# Patient Record
Sex: FEMALE | URBAN - METROPOLITAN AREA
[De-identification: names, ages, dates, MRNs, and addresses within clinical notes are randomized per-mention and may not be internally consistent; named-entity substitution may affect disease eponyms.]

---

## 2023-11-09 ENCOUNTER — APPOINTMENT (RX ONLY)
Dept: URBAN - METROPOLITAN AREA CLINIC 48 | Facility: CLINIC | Age: 58
Setting detail: DERMATOLOGY
End: 2023-11-09

## 2023-11-09 DIAGNOSIS — L82.0 INFLAMED SEBORRHEIC KERATOSIS: ICD-10-CM

## 2023-11-09 DIAGNOSIS — D22 MELANOCYTIC NEVI: ICD-10-CM

## 2023-11-09 DIAGNOSIS — L72.0 EPIDERMAL CYST: ICD-10-CM

## 2023-11-09 PROBLEM — D48.5 NEOPLASM OF UNCERTAIN BEHAVIOR OF SKIN: Status: ACTIVE | Noted: 2023-11-09

## 2023-11-09 PROBLEM — D22.9 MELANOCYTIC NEVI, UNSPECIFIED: Status: ACTIVE | Noted: 2023-11-09

## 2023-11-09 PROCEDURE — 99203 OFFICE O/P NEW LOW 30 MIN: CPT

## 2023-11-09 PROCEDURE — ? DEFER

## 2023-11-09 PROCEDURE — ? COUNSELING

## 2023-11-09 NOTE — PROCEDURE: DEFER
Size Of Lesion In Cm (Optional): 0
Detail Level: Detailed
Instructions (Optional): Schedule excision with Dr. Ally Mcknight, right [facility]\\(32172, 06161)
Introduction Text (Please End With A Colon): ;
Instructions (Optional): Schedule excision, left Axilla\\n(27615, 28408)
Instructions (Optional): Will request prior auth for benign destruction (10419)

## 2023-12-13 ENCOUNTER — APPOINTMENT (RX ONLY)
Dept: URBAN - METROPOLITAN AREA CLINIC 48 | Facility: CLINIC | Age: 58
Setting detail: DERMATOLOGY
End: 2023-12-13

## 2023-12-13 DIAGNOSIS — D485 NEOPLASM OF UNCERTAIN BEHAVIOR OF SKIN: ICD-10-CM

## 2023-12-13 PROBLEM — D48.5 NEOPLASM OF UNCERTAIN BEHAVIOR OF SKIN: Status: ACTIVE | Noted: 2023-12-13

## 2023-12-13 PROCEDURE — 13132 CMPLX RPR F/C/C/M/N/AX/G/H/F: CPT

## 2023-12-13 PROCEDURE — 11443 EXC FACE-MM B9+MARG 2.1-3 CM: CPT

## 2023-12-13 PROCEDURE — ? COUNSELING

## 2023-12-13 PROCEDURE — ? EXCISION

## 2023-12-13 PROCEDURE — ? PRESCRIPTION

## 2023-12-13 RX ORDER — GENTAMICIN SULFATE 1 MG/G
1 CREAM TOPICAL
Qty: 30 | Refills: 0 | Status: ERX | COMMUNITY
Start: 2023-12-13

## 2023-12-13 RX ORDER — GENTAMICIN SULFATE 1 MG/G
1 CREAM TOPICAL
Qty: 30 | Refills: 0 | Status: ERX

## 2023-12-13 RX ADMIN — GENTAMICIN SULFATE 1: 1 CREAM TOPICAL at 00:00

## 2023-12-13 ASSESSMENT — LOCATION SIMPLE DESCRIPTION DERM: LOCATION SIMPLE: RIGHT CHEEK

## 2023-12-13 ASSESSMENT — LOCATION ZONE DERM: LOCATION ZONE: FACE

## 2023-12-13 ASSESSMENT — LOCATION DETAILED DESCRIPTION DERM: LOCATION DETAILED: RIGHT SUPERIOR LATERAL MALAR CHEEK

## 2023-12-26 ENCOUNTER — APPOINTMENT (RX ONLY)
Dept: URBAN - METROPOLITAN AREA CLINIC 48 | Facility: CLINIC | Age: 58
Setting detail: DERMATOLOGY
End: 2023-12-26

## 2023-12-26 DIAGNOSIS — Z48.02 ENCOUNTER FOR REMOVAL OF SUTURES: ICD-10-CM

## 2023-12-26 PROCEDURE — 99024 POSTOP FOLLOW-UP VISIT: CPT

## 2023-12-26 PROCEDURE — ? SUTURE REMOVAL (GLOBAL PERIOD)

## 2023-12-26 PROCEDURE — ? COUNSELING

## 2023-12-26 ASSESSMENT — LOCATION DETAILED DESCRIPTION DERM
LOCATION DETAILED: RIGHT SUPERIOR CENTRAL MALAR CHEEK
LOCATION DETAILED: RIGHT CENTRAL MALAR CHEEK

## 2023-12-26 ASSESSMENT — LOCATION ZONE DERM: LOCATION ZONE: FACE

## 2023-12-26 ASSESSMENT — LOCATION SIMPLE DESCRIPTION DERM: LOCATION SIMPLE: RIGHT CHEEK

## 2024-01-03 ENCOUNTER — APPOINTMENT (RX ONLY)
Dept: URBAN - METROPOLITAN AREA CLINIC 48 | Facility: CLINIC | Age: 59
Setting detail: DERMATOLOGY
End: 2024-01-03

## 2024-01-03 DIAGNOSIS — Z48.02 ENCOUNTER FOR REMOVAL OF SUTURES: ICD-10-CM

## 2024-01-03 PROCEDURE — ? SUTURE REMOVAL (GLOBAL PERIOD)

## 2024-01-03 PROCEDURE — ? COUNSELING

## 2024-01-03 PROCEDURE — 99024 POSTOP FOLLOW-UP VISIT: CPT

## 2024-01-03 ASSESSMENT — LOCATION SIMPLE DESCRIPTION DERM: LOCATION SIMPLE: RIGHT CHEEK

## 2024-01-03 ASSESSMENT — LOCATION DETAILED DESCRIPTION DERM: LOCATION DETAILED: RIGHT CENTRAL MALAR CHEEK

## 2024-01-03 ASSESSMENT — LOCATION ZONE DERM: LOCATION ZONE: FACE

## 2024-02-12 ENCOUNTER — APPOINTMENT (RX ONLY)
Dept: URBAN - METROPOLITAN AREA CLINIC 48 | Facility: CLINIC | Age: 59
Setting detail: DERMATOLOGY
End: 2024-02-12

## 2024-02-12 DIAGNOSIS — L82.0 INFLAMED SEBORRHEIC KERATOSIS: ICD-10-CM

## 2024-02-12 DIAGNOSIS — L90.5 SCAR CONDITIONS AND FIBROSIS OF SKIN: ICD-10-CM

## 2024-02-12 PROBLEM — D48.5 NEOPLASM OF UNCERTAIN BEHAVIOR OF SKIN: Status: ACTIVE | Noted: 2024-02-12

## 2024-02-12 PROCEDURE — ? DEFER

## 2024-02-12 PROCEDURE — ? COUNSELING

## 2024-02-12 PROCEDURE — ? PRESCRIPTION MEDICATION MANAGEMENT

## 2024-02-12 PROCEDURE — 99213 OFFICE O/P EST LOW 20 MIN: CPT

## 2024-02-28 ENCOUNTER — APPOINTMENT (RX ONLY)
Dept: URBAN - METROPOLITAN AREA CLINIC 48 | Facility: CLINIC | Age: 59
Setting detail: DERMATOLOGY
End: 2024-02-28

## 2024-02-28 DIAGNOSIS — L0391 CELLULITIS AND ABSCESS OF UNSPECIFIED SITES: ICD-10-CM

## 2024-02-28 DIAGNOSIS — L0390 CELLULITIS AND ABSCESS OF UNSPECIFIED SITES: ICD-10-CM

## 2024-02-28 DIAGNOSIS — L82.0 INFLAMED SEBORRHEIC KERATOSIS: ICD-10-CM

## 2024-02-28 PROBLEM — L02.91 CUTANEOUS ABSCESS, UNSPECIFIED: Status: ACTIVE | Noted: 2024-02-28

## 2024-02-28 PROBLEM — D48.5 NEOPLASM OF UNCERTAIN BEHAVIOR OF SKIN: Status: ACTIVE | Noted: 2024-02-28

## 2024-02-28 PROCEDURE — ? COUNSELING

## 2024-02-28 PROCEDURE — 99213 OFFICE O/P EST LOW 20 MIN: CPT | Mod: 25

## 2024-02-28 PROCEDURE — 11306 SHAVE SKIN LESION 0.6-1.0 CM: CPT

## 2024-02-28 PROCEDURE — ? SHAVE REMOVAL

## 2024-02-28 PROCEDURE — ? PRESCRIPTION MEDICATION MANAGEMENT

## 2024-02-28 ASSESSMENT — LOCATION DETAILED DESCRIPTION DERM: LOCATION DETAILED: RIGHT SUPERIOR ANTERIOR NECK

## 2024-02-28 ASSESSMENT — LOCATION SIMPLE DESCRIPTION DERM: LOCATION SIMPLE: RIGHT ANTERIOR NECK

## 2024-02-28 ASSESSMENT — LOCATION ZONE DERM: LOCATION ZONE: NECK

## 2024-05-09 ENCOUNTER — APPOINTMENT (RX ONLY)
Dept: URBAN - METROPOLITAN AREA CLINIC 48 | Facility: CLINIC | Age: 59
Setting detail: DERMATOLOGY
End: 2024-05-09

## 2024-05-09 DIAGNOSIS — D485 NEOPLASM OF UNCERTAIN BEHAVIOR OF SKIN: ICD-10-CM

## 2024-05-09 PROBLEM — D48.5 NEOPLASM OF UNCERTAIN BEHAVIOR OF SKIN: Status: ACTIVE | Noted: 2024-05-09

## 2024-05-09 PROCEDURE — 13100 CMPLX RPR TRUNK 1.1-2.5 CM: CPT

## 2024-05-09 PROCEDURE — 11401 EXC TR-EXT B9+MARG 0.6-1 CM: CPT

## 2024-05-09 PROCEDURE — ? COUNSELING

## 2024-05-09 PROCEDURE — ? EXCISION

## 2024-05-09 ASSESSMENT — LOCATION ZONE DERM: LOCATION ZONE: TRUNK

## 2024-05-09 ASSESSMENT — LOCATION DETAILED DESCRIPTION DERM: LOCATION DETAILED: LEFT MID-UPPER BACK

## 2024-05-09 ASSESSMENT — LOCATION SIMPLE DESCRIPTION DERM: LOCATION SIMPLE: LEFT UPPER BACK

## 2024-05-24 ENCOUNTER — APPOINTMENT (RX ONLY)
Dept: URBAN - METROPOLITAN AREA CLINIC 48 | Facility: CLINIC | Age: 59
Setting detail: DERMATOLOGY
End: 2024-05-24

## 2024-05-24 DIAGNOSIS — Z48.02 ENCOUNTER FOR REMOVAL OF SUTURES: ICD-10-CM

## 2024-05-24 PROCEDURE — ? COUNSELING

## 2024-05-24 PROCEDURE — ? SUTURE REMOVAL (GLOBAL PERIOD)

## 2024-05-24 PROCEDURE — 99024 POSTOP FOLLOW-UP VISIT: CPT

## 2024-05-24 ASSESSMENT — LOCATION SIMPLE DESCRIPTION DERM: LOCATION SIMPLE: LEFT UPPER BACK

## 2024-05-24 ASSESSMENT — LOCATION DETAILED DESCRIPTION DERM: LOCATION DETAILED: LEFT SUPERIOR UPPER BACK

## 2024-05-24 ASSESSMENT — LOCATION ZONE DERM: LOCATION ZONE: TRUNK

## 2024-06-03 ENCOUNTER — APPOINTMENT (RX ONLY)
Dept: URBAN - METROPOLITAN AREA CLINIC 48 | Facility: CLINIC | Age: 59
Setting detail: DERMATOLOGY
End: 2024-06-03

## 2024-06-03 DIAGNOSIS — S31000A OPEN WOUND(S) (MULTIPLE) OF UNSPECIFIED SITE(S), WITHOUT MENTION OF COMPLICATION: ICD-10-CM

## 2024-06-03 PROBLEM — S21.209A UNSPECIFIED OPEN WOUND OF UNSPECIFIED BACK WALL OF THORAX WITHOUT PENETRATION INTO THORACIC CAVITY, INITIAL ENCOUNTER: Status: ACTIVE | Noted: 2024-06-03

## 2024-06-03 PROCEDURE — 99024 POSTOP FOLLOW-UP VISIT: CPT

## 2024-06-03 PROCEDURE — ? COUNSELING

## 2024-06-03 PROCEDURE — ? DRESSING CHANGE

## 2024-06-03 ASSESSMENT — LOCATION DETAILED DESCRIPTION DERM: LOCATION DETAILED: LEFT MEDIAL UPPER BACK

## 2024-06-03 ASSESSMENT — LOCATION ZONE DERM: LOCATION ZONE: TRUNK

## 2024-06-03 ASSESSMENT — LOCATION SIMPLE DESCRIPTION DERM: LOCATION SIMPLE: LEFT UPPER BACK
